# Patient Record
Sex: FEMALE | Race: WHITE | Employment: FULL TIME | URBAN - METROPOLITAN AREA
[De-identification: names, ages, dates, MRNs, and addresses within clinical notes are randomized per-mention and may not be internally consistent; named-entity substitution may affect disease eponyms.]

---

## 2022-07-08 ENCOUNTER — MEDICAL CORRESPONDENCE (OUTPATIENT)
Dept: CONSULT | Facility: CLINIC | Age: 47
End: 2022-07-08

## 2022-07-08 ENCOUNTER — DOCUMENTATION ONLY (OUTPATIENT)
Dept: CONSULT | Facility: CLINIC | Age: 47
End: 2022-07-08

## 2022-07-08 NOTE — PROGRESS NOTES
Name: Liliam Snyder   : 1975  MRN: [id number]  Date of Service: 2022  PCP: Sandra Rodriguez    Presenting information:   Liliam is a 46 year old female who was seen to consent for exome sequencing to aid in interpretation of her child's sample.  I discussed the testing with Liliam in person at her child's appointment.    Medical History:   Liliam does not have any similar symptoms to her child.      No past medical history on file.     Family History: A three generation pedigree was obtained at Liliam's child's visit and scanned into the EMR.        Discussion: Liliam consented to provide samples to aid in interpretation of the proband's exome sequencing. This test can identify familial relationships. The potential results were discussed including a positive, negative, or variant of uncertain significance. Familial samples would be used to determine how it was inherited, if at all. A report will not be issued for Liliam separate from proband's report. Communication of results is directly to proband's medical decision-makers. They may choose to share with information with family members. If the change is believed to be pathogenic, it can alter clinical management for Liliam and provide recurrence risk information. Testing other family members or pregnancies can be considered. Communication of this information depends on the medical decision-makers for proband.     Liliam understood the purpose of the testing.     [facility] Secondary Findings  We reviewed that the lab can report the results of gene mutations that are found in genes recommended by the [employer] ([facility]) to be reported to ES patients even if the gene variant does not contribute to their current symptoms.  Many of these gene changes may not be associated with symptoms until adulthood and are not traditionally tested for in children, but may lead to medical management changes. Examples include genes  "related to increased cancer risk, heart conditions, or metabolic conditions. In addition, relative status for a change in one of the secondary findings genes may sought from Liliam's results.      We discussed that there are insurance implications related to these findings in terms of life, short term disability, and long term disability insurance. There is a federal law in place at the moment, The Genetic Information Nondiscrimination Act or CHARITY (2008) that protects again health insurance discrimination.  Health insurance protections do not apply to members of the [place]  who receive care through [employer], Veterans receiving care through the VA, the [facility], or federal employees who receive care through Federal Employees Health Benefits Plan. Employers may not discriminate (hiring, firing, promotions etc.), based on genetic information. This only applies to companies with 15 or more employees. It does not apply to federal employees, or , which have their own nondiscrimination protections in place. Employers may have \"voluntary\" health services such as employee wellness programs that request genetic information or family history, which is not a violation of CHARITY.     At this time, the family decided to accepted the results from the [facility] secondary findings for herself. This will be reported for proband directly and communication of this information is dependent on proband's medical decision-makers.    Consent/Insurance Coverage for WALKER Ricketts provided informed consent for the testing, signed with verbal consent (COVID-19). They will not be receiving individual reports from this test. All samples must be received within three weeks of each other. Additional questions or concerns were denied.       PLAN  1. The purpose and process of exome sequencing (ES) was reviewed today.  2. After reviewing the risks, benefits, and limitations of genetic testing, consent was obtained for exome sequencing " for Liliam via a buccal sample.  No orders of the defined types were placed in this encounter.    3. Liliam will not receive a report for this test.  4. Contact information was provided to the family.  Additional questions or concerns were denied.      Petty Paz [facility]  Genetic Counselor   [facility]   Phone: [phone]  Pager: [phone]  Email: [email or web address]      CC: Patient